# Patient Record
Sex: MALE | Race: WHITE | NOT HISPANIC OR LATINO | URBAN - METROPOLITAN AREA
[De-identification: names, ages, dates, MRNs, and addresses within clinical notes are randomized per-mention and may not be internally consistent; named-entity substitution may affect disease eponyms.]

---

## 2024-04-18 ENCOUNTER — LAB (OUTPATIENT)
Dept: URBAN - METROPOLITAN AREA CLINIC 105 | Facility: CLINIC | Age: 34
End: 2024-04-18

## 2024-04-18 ENCOUNTER — OV NP (OUTPATIENT)
Dept: URBAN - METROPOLITAN AREA CLINIC 105 | Facility: CLINIC | Age: 34
End: 2024-04-18
Payer: COMMERCIAL

## 2024-04-18 VITALS
DIASTOLIC BLOOD PRESSURE: 90 MMHG | BODY MASS INDEX: 44.35 KG/M2 | WEIGHT: 241 LBS | SYSTOLIC BLOOD PRESSURE: 127 MMHG | HEART RATE: 80 BPM | HEIGHT: 62 IN | TEMPERATURE: 97.2 F

## 2024-04-18 DIAGNOSIS — R14.0 BLOATING: ICD-10-CM

## 2024-04-18 DIAGNOSIS — R10.13 EPIGASTRIC ABDOMINAL PAIN: ICD-10-CM

## 2024-04-18 DIAGNOSIS — R12 HEARTBURN: ICD-10-CM

## 2024-04-18 DIAGNOSIS — R19.4 CHANGE IN BOWEL HABITS: ICD-10-CM

## 2024-04-18 PROCEDURE — 99203 OFFICE O/P NEW LOW 30 MIN: CPT | Performed by: INTERNAL MEDICINE

## 2024-04-18 RX ORDER — PANTOPRAZOLE SODIUM 20 MG/1
1 TABLET TABLET, DELAYED RELEASE ORAL ONCE A DAY
Qty: 90 TABLET | Refills: 3 | OUTPATIENT
Start: 2024-04-18

## 2024-04-18 NOTE — HPI-TODAY'S VISIT:
Pt says that abut 2 years ago, "it started when I went out for dinner". had burger and french fries. he was awakened in the middle of the night nauseaed and felt like he needed  to vomit. patient He has seen Dr. Turk in the past, about a year and a half ago and had EGD/colon per report.  Pt says if he drinks ETOH or coffee, it seems like food just "sits" in the stomach. he does not get hungry of have a bowel movement.  He feels like "it will just not move". he will get lots of gas and bloating and fullness. lots of sounds in the abdomen.  stools smell bad. he tells me that his stools are small and little pieces and "broken up larger pieces".  he occasionally has diarrhea. if he has about 2 beers, he will have looser stools. - He has limited diiet that has helped with these spells, he has a spell about once a week.

## 2024-04-24 LAB
IMMUNOGLOBULIN A, QN, SERUM: 103
INTERPRETATION: (no result)
T-TRANSGLUTAMINASE (TTG) IGA: <1

## 2024-05-01 ENCOUNTER — WEB ENCOUNTER (OUTPATIENT)
Dept: URBAN - METROPOLITAN AREA CLINIC 105 | Facility: CLINIC | Age: 34
End: 2024-05-01

## 2024-05-01 ENCOUNTER — TELEPHONE ENCOUNTER (OUTPATIENT)
Dept: URBAN - METROPOLITAN AREA CLINIC 105 | Facility: CLINIC | Age: 34
End: 2024-05-01

## 2024-05-01 RX ORDER — PANCRELIPASE LIPASE, PANCRELIPASE PROTEASE, PANCRELIPASE AMYLASE 40000; 126000; 168000 [USP'U]/1; [USP'U]/1; [USP'U]/1
2 PO THREE TIMES DAILY WITH MEALS CAPSULE, DELAYED RELEASE ORAL
Qty: 200 | Refills: 5 | OUTPATIENT
Start: 2024-05-02 | End: 2024-10-29

## 2024-05-15 ENCOUNTER — DASHBOARD ENCOUNTERS (OUTPATIENT)
Age: 34
End: 2024-05-15

## 2024-05-15 ENCOUNTER — OFFICE VISIT (OUTPATIENT)
Dept: URBAN - METROPOLITAN AREA CLINIC 105 | Facility: CLINIC | Age: 34
End: 2024-05-15
Payer: COMMERCIAL

## 2024-05-15 VITALS
HEIGHT: 62 IN | SYSTOLIC BLOOD PRESSURE: 134 MMHG | WEIGHT: 239 LBS | BODY MASS INDEX: 43.98 KG/M2 | DIASTOLIC BLOOD PRESSURE: 88 MMHG | HEART RATE: 87 BPM | TEMPERATURE: 97.3 F

## 2024-05-15 DIAGNOSIS — R14.2 BELCHING: ICD-10-CM

## 2024-05-15 DIAGNOSIS — K86.81 EXOCRINE PANCREATIC INSUFFICIENCY: ICD-10-CM

## 2024-05-15 DIAGNOSIS — K21.9 CHRONIC GERD: ICD-10-CM

## 2024-05-15 DIAGNOSIS — K31.84 GASTROPARESIS: ICD-10-CM

## 2024-05-15 PROBLEM — 235595009: Status: ACTIVE | Noted: 2024-05-15

## 2024-05-15 PROBLEM — 235675006: Status: ACTIVE | Noted: 2024-05-15

## 2024-05-15 PROCEDURE — 99213 OFFICE O/P EST LOW 20 MIN: CPT | Performed by: INTERNAL MEDICINE

## 2024-05-15 RX ORDER — PANCRELIPASE LIPASE, PANCRELIPASE PROTEASE, PANCRELIPASE AMYLASE 40000; 126000; 168000 [USP'U]/1; [USP'U]/1; [USP'U]/1
2 PO THREE TIMES DAILY WITH MEALS CAPSULE, DELAYED RELEASE ORAL
Qty: 200 | Refills: 5 | Status: ACTIVE | COMMUNITY
Start: 2024-05-02 | End: 2024-10-29

## 2024-05-15 RX ORDER — PANTOPRAZOLE SODIUM 20 MG/1
1 TABLET TABLET, DELAYED RELEASE ORAL ONCE A DAY
Qty: 90 TABLET | Refills: 3 | Status: ACTIVE | COMMUNITY
Start: 2024-04-18

## 2024-05-20 LAB
FERRITIN, SERUM: 162
FOLATE, SERUM: 10.2
VITAMIN A: 45
VITAMIN B12: 303
VITAMIN D,25-OH,TOTAL,IA: 21

## 2024-05-21 ENCOUNTER — TELEPHONE ENCOUNTER (OUTPATIENT)
Dept: URBAN - METROPOLITAN AREA CLINIC 105 | Facility: CLINIC | Age: 34
End: 2024-05-21

## 2024-05-21 RX ORDER — ERGOCALCIFEROL 1.25 MG/1
2 CAPSULES CAPSULE ORAL
Qty: 20 | Refills: 1 | OUTPATIENT
Start: 2024-05-21 | End: 2024-11-16

## 2024-12-05 ENCOUNTER — OFFICE VISIT (OUTPATIENT)
Dept: URBAN - METROPOLITAN AREA CLINIC 105 | Facility: CLINIC | Age: 34
End: 2024-12-05
Payer: COMMERCIAL

## 2024-12-05 VITALS
HEART RATE: 79 BPM | BODY MASS INDEX: 43.43 KG/M2 | SYSTOLIC BLOOD PRESSURE: 118 MMHG | HEIGHT: 62 IN | WEIGHT: 236 LBS | TEMPERATURE: 97.2 F | DIASTOLIC BLOOD PRESSURE: 87 MMHG

## 2024-12-05 DIAGNOSIS — R14.0 ABDOMINAL BLOATING: ICD-10-CM

## 2024-12-05 DIAGNOSIS — K21.9 CHRONIC GERD: ICD-10-CM

## 2024-12-05 DIAGNOSIS — K86.81 EXOCRINE PANCREATIC INSUFFICIENCY: ICD-10-CM

## 2024-12-05 DIAGNOSIS — E55.9 VITAMIN D DEFICIENCY: ICD-10-CM

## 2024-12-05 DIAGNOSIS — K31.84 NONDIABETIC GASTROPARESIS: ICD-10-CM

## 2024-12-05 DIAGNOSIS — R14.2 BELCHING: ICD-10-CM

## 2024-12-05 PROBLEM — 75994008: Status: ACTIVE | Noted: 2024-12-05

## 2024-12-05 PROBLEM — 47367009: Status: ACTIVE | Noted: 2024-12-05

## 2024-12-05 PROBLEM — 34713006: Status: ACTIVE | Noted: 2024-12-05

## 2024-12-05 PROCEDURE — 99213 OFFICE O/P EST LOW 20 MIN: CPT | Performed by: INTERNAL MEDICINE

## 2024-12-05 RX ORDER — PANTOPRAZOLE SODIUM 20 MG/1
1 TABLET TABLET, DELAYED RELEASE ORAL ONCE A DAY
Qty: 90 TABLET | Refills: 3 | Status: ACTIVE | COMMUNITY
Start: 2024-04-18

## 2024-12-05 NOTE — HPI-TODAY'S VISIT:
Pt says that abut 2 years ago, "it started when I went out for dinner". had burger and french fries. he was awakened in the middle of the night nauseaed and felt like he needed  to vomit. patient He has seen Dr. Turk in the past, about a year and a half ago and had EGD/colon per report.  Pt says if he drinks ETOH or coffee, it seems like food just "sits" in the stomach. he does not get hungry of have a bowel movement.  He feels like "it will just not move". he will get lots of gas and bloating and fullness. lots of sounds in the abdomen.  stools smell bad. he tells me that his stools are small and little pieces and "broken up larger pieces".  he occasionally has diarrhea. if he has about 2 beers, he will have looser stools. - He has limited diiet that has helped with these spells, he has a spell about once a week. - 5/15/2024: he is having completely normal stools now. taking zenpep faithfully. he is having normal stools. no blood in the stool. - 12/5/2024: pt says that he typically tries to eat early. if he does not eat breakfast in the AM he will have a little bit of a burning sensation. goes away by lunch time. on occasion, if he drinks a soda, he will get hiccups.   - stools are normal without any diarreha gas/bloat/cramps. he is still taking the pancreatic enzymes. he can tell if he misses a dose.

## 2024-12-06 ENCOUNTER — TELEPHONE ENCOUNTER (OUTPATIENT)
Dept: URBAN - METROPOLITAN AREA CLINIC 105 | Facility: CLINIC | Age: 34
End: 2024-12-06

## 2024-12-06 LAB — VITAMIN D,25-OH,TOTAL,IA: 74

## 2025-01-06 ENCOUNTER — TELEPHONE ENCOUNTER (OUTPATIENT)
Dept: URBAN - METROPOLITAN AREA CLINIC 105 | Facility: CLINIC | Age: 35
End: 2025-01-06

## 2025-01-06 RX ORDER — PANCRELIPASE LIPASE, PANCRELIPASE PROTEASE, PANCRELIPASE AMYLASE 40000; 126000; 168000 [USP'U]/1; [USP'U]/1; [USP'U]/1
2 PO THREE TIMES DAILY WITH MEALS CAPSULE, DELAYED RELEASE ORAL
Qty: 200 | Refills: 5
Start: 2024-05-02 | End: 2025-07-05

## 2025-05-15 ENCOUNTER — TELEPHONE ENCOUNTER (OUTPATIENT)
Dept: URBAN - METROPOLITAN AREA CLINIC 105 | Facility: CLINIC | Age: 35
End: 2025-05-15

## 2025-05-15 RX ORDER — PANTOPRAZOLE SODIUM 20 MG/1
1 TABLET TABLET, DELAYED RELEASE ORAL ONCE A DAY
Qty: 90 TABLET | Refills: 0
Start: 2024-04-18

## 2025-05-15 RX ORDER — PANCRELIPASE LIPASE, PANCRELIPASE PROTEASE, PANCRELIPASE AMYLASE 40000; 126000; 168000 [USP'U]/1; [USP'U]/1; [USP'U]/1
2 PO THREE TIMES DAILY WITH MEALS CAPSULE, DELAYED RELEASE ORAL
Qty: 200 | Refills: 0
Start: 2024-05-02

## 2025-05-28 ENCOUNTER — OFFICE VISIT (OUTPATIENT)
Dept: URBAN - METROPOLITAN AREA CLINIC 124 | Facility: CLINIC | Age: 35
End: 2025-05-28
Payer: COMMERCIAL

## 2025-05-28 DIAGNOSIS — K21.9 GERD: ICD-10-CM

## 2025-05-28 DIAGNOSIS — K31.84 GASTROPARESIS: ICD-10-CM

## 2025-05-28 DIAGNOSIS — K86.81 EXOCRINE PANCREATIC INSUFFICIENCY: ICD-10-CM

## 2025-05-28 PROCEDURE — 99214 OFFICE O/P EST MOD 30 MIN: CPT | Performed by: INTERNAL MEDICINE

## 2025-05-28 PROCEDURE — G2211 COMPLEX E/M VISIT ADD ON: HCPCS | Performed by: INTERNAL MEDICINE

## 2025-05-28 RX ORDER — PANTOPRAZOLE SODIUM 20 MG/1
1 TABLET TABLET, DELAYED RELEASE ORAL ONCE A DAY
Qty: 90 TABLET | Refills: 0 | COMMUNITY
Start: 2024-04-18

## 2025-05-28 RX ORDER — PANCRELIPASE LIPASE, PANCRELIPASE PROTEASE, PANCRELIPASE AMYLASE 40000; 126000; 168000 [USP'U]/1; [USP'U]/1; [USP'U]/1
2 PO THREE TIMES DAILY WITH MEALS CAPSULE, DELAYED RELEASE ORAL
Qty: 200 | Refills: 0 | COMMUNITY
Start: 2024-05-02

## 2025-05-28 NOTE — HPI-TODAY'S VISIT:
Pt says that abut 2 years ago, "it started when I went out for dinner". had burger and french fries. he was awakened in the middle of the night nauseaed and felt like he needed  to vomit. patient He has seen Dr. Turk in the past, about a year and a half ago and had EGD/colon per report.  Pt says if he drinks ETOH or coffee, it seems like food just "sits" in the stomach. he does not get hungry of have a bowel movement.  He feels like "it will just not move". he will get lots of gas and bloating and fullness. lots of sounds in the abdomen.  stools smell bad. he tells me that his stools are small and little pieces and "broken up larger pieces".  he occasionally has diarrhea. if he has about 2 beers, he will have looser stools. - He has limited diiet that has helped with these spells, he has a spell about once a week. - 5/15/2024: he is having completely normal stools now. taking zenpep faithfully. he is having normal stools. no blood in the stool. - 12/5/2024: pt says that he typically tries to eat early. if he does not eat breakfast in the AM he will have a little bit of a burning sensation. goes away by lunch time. on occasion, if he drinks a soda, he will get hiccups.   - stools are normal without any diarreha gas/bloat/cramps. he is still taking the pancreatic enzymes. he can tell if he misses a dose.  ***5/2025: So long as he takes his enzymes, he feels well; if he misses a dose, he feels like "food does not move through," and develops upper abdominal discomfort and regurgitation and HB. Continutes on pantoprazole 20mg daily, no sxs if misses a dose. Would like to understand why he has EPI.

## 2025-06-16 ENCOUNTER — TELEPHONE ENCOUNTER (OUTPATIENT)
Dept: URBAN - METROPOLITAN AREA CLINIC 92 | Facility: CLINIC | Age: 35
End: 2025-06-16

## 2025-06-16 RX ORDER — NEOMYCIN SULFATE 500 MG/1
1 TABLET TABLET ORAL TWICE A DAY
Qty: 28 | Refills: 0 | OUTPATIENT
Start: 2025-06-16

## 2025-06-17 ENCOUNTER — TELEPHONE ENCOUNTER (OUTPATIENT)
Dept: URBAN - METROPOLITAN AREA CLINIC 92 | Facility: CLINIC | Age: 35
End: 2025-06-17

## 2025-07-28 ENCOUNTER — WEB ENCOUNTER (OUTPATIENT)
Dept: URBAN - METROPOLITAN AREA CLINIC 124 | Facility: CLINIC | Age: 35
End: 2025-07-28